# Patient Record
Sex: FEMALE | Race: WHITE | NOT HISPANIC OR LATINO | Employment: OTHER | URBAN - METROPOLITAN AREA
[De-identification: names, ages, dates, MRNs, and addresses within clinical notes are randomized per-mention and may not be internally consistent; named-entity substitution may affect disease eponyms.]

---

## 2018-02-26 ENCOUNTER — OFFICE VISIT (OUTPATIENT)
Dept: FAMILY MEDICINE CLINIC | Facility: CLINIC | Age: 53
End: 2018-02-26

## 2018-02-26 ENCOUNTER — LAB (OUTPATIENT)
Dept: LAB | Facility: HOSPITAL | Age: 53
End: 2018-02-26

## 2018-02-26 VITALS
SYSTOLIC BLOOD PRESSURE: 126 MMHG | BODY MASS INDEX: 32.78 KG/M2 | DIASTOLIC BLOOD PRESSURE: 76 MMHG | HEIGHT: 64 IN | WEIGHT: 192 LBS

## 2018-02-26 DIAGNOSIS — Z01.89 ROUTINE LAB DRAW: Primary | ICD-10-CM

## 2018-02-26 DIAGNOSIS — Z12.39 BREAST CANCER SCREENING: ICD-10-CM

## 2018-02-26 DIAGNOSIS — Z01.89 ROUTINE LAB DRAW: ICD-10-CM

## 2018-02-26 LAB
ALBUMIN SERPL-MCNC: 4.4 G/DL (ref 3.2–4.8)
ALBUMIN/GLOB SERPL: 1.5 G/DL (ref 1.5–2.5)
ALP SERPL-CCNC: 108 U/L (ref 25–100)
ALT SERPL W P-5'-P-CCNC: 19 U/L (ref 7–40)
ANION GAP SERPL CALCULATED.3IONS-SCNC: 6 MMOL/L (ref 3–11)
ARTICHOKE IGE QN: 141 MG/DL (ref 0–130)
AST SERPL-CCNC: 23 U/L (ref 0–33)
BASOPHILS # BLD AUTO: 0.03 10*3/MM3 (ref 0–0.2)
BASOPHILS NFR BLD AUTO: 0.4 % (ref 0–1)
BILIRUB SERPL-MCNC: 0.2 MG/DL (ref 0.3–1.2)
BUN BLD-MCNC: 12 MG/DL (ref 9–23)
BUN/CREAT SERPL: 17.1 (ref 7–25)
CALCIUM SPEC-SCNC: 9.6 MG/DL (ref 8.7–10.4)
CHLORIDE SERPL-SCNC: 105 MMOL/L (ref 99–109)
CHOLEST SERPL-MCNC: 225 MG/DL (ref 0–200)
CO2 SERPL-SCNC: 27 MMOL/L (ref 20–31)
CREAT BLD-MCNC: 0.7 MG/DL (ref 0.6–1.3)
DEPRECATED RDW RBC AUTO: 46.5 FL (ref 37–54)
EOSINOPHIL # BLD AUTO: 0.15 10*3/MM3 (ref 0–0.3)
EOSINOPHIL NFR BLD AUTO: 2.2 % (ref 0–3)
ERYTHROCYTE [DISTWIDTH] IN BLOOD BY AUTOMATED COUNT: 13.3 % (ref 11.3–14.5)
GFR SERPL CREATININE-BSD FRML MDRD: 88 ML/MIN/1.73
GLOBULIN UR ELPH-MCNC: 3 GM/DL
GLUCOSE BLD-MCNC: 93 MG/DL (ref 70–100)
HCT VFR BLD AUTO: 40.6 % (ref 34.5–44)
HCV AB SER DONR QL: NORMAL
HDLC SERPL-MCNC: 74 MG/DL (ref 40–60)
HGB BLD-MCNC: 13 G/DL (ref 11.5–15.5)
IMM GRANULOCYTES # BLD: 0.02 10*3/MM3 (ref 0–0.03)
IMM GRANULOCYTES NFR BLD: 0.3 % (ref 0–0.6)
LYMPHOCYTES # BLD AUTO: 2.19 10*3/MM3 (ref 0.6–4.8)
LYMPHOCYTES NFR BLD AUTO: 32.1 % (ref 24–44)
MCH RBC QN AUTO: 30.5 PG (ref 27–31)
MCHC RBC AUTO-ENTMCNC: 32 G/DL (ref 32–36)
MCV RBC AUTO: 95.3 FL (ref 80–99)
MONOCYTES # BLD AUTO: 0.45 10*3/MM3 (ref 0–1)
MONOCYTES NFR BLD AUTO: 6.6 % (ref 0–12)
NEUTROPHILS # BLD AUTO: 3.99 10*3/MM3 (ref 1.5–8.3)
NEUTROPHILS NFR BLD AUTO: 58.4 % (ref 41–71)
PLATELET # BLD AUTO: 310 10*3/MM3 (ref 150–450)
PMV BLD AUTO: 10.7 FL (ref 6–12)
POTASSIUM BLD-SCNC: 4.7 MMOL/L (ref 3.5–5.5)
PROT SERPL-MCNC: 7.4 G/DL (ref 5.7–8.2)
RBC # BLD AUTO: 4.26 10*6/MM3 (ref 3.89–5.14)
SODIUM BLD-SCNC: 138 MMOL/L (ref 132–146)
TRIGL SERPL-MCNC: 107 MG/DL (ref 0–150)
TSH SERPL DL<=0.05 MIU/L-ACNC: 0.77 MIU/ML (ref 0.35–5.35)
WBC NRBC COR # BLD: 6.83 10*3/MM3 (ref 3.5–10.8)

## 2018-02-26 PROCEDURE — 84443 ASSAY THYROID STIM HORMONE: CPT

## 2018-02-26 PROCEDURE — 80061 LIPID PANEL: CPT

## 2018-02-26 PROCEDURE — 80053 COMPREHEN METABOLIC PANEL: CPT

## 2018-02-26 PROCEDURE — 85025 COMPLETE CBC W/AUTO DIFF WBC: CPT

## 2018-02-26 PROCEDURE — 99212-NC PR NO CHARGE CBC OFFICE OUTPATIENT VISIT 10 MINUTES: Performed by: NURSE PRACTITIONER

## 2018-02-26 PROCEDURE — 36415 COLL VENOUS BLD VENIPUNCTURE: CPT

## 2018-02-26 PROCEDURE — 86803 HEPATITIS C AB TEST: CPT

## 2018-02-26 NOTE — PROGRESS NOTES
Subjective   Viry Andres is a 52 y.o. female here to establish care.  Chief Complaint   Patient presents with   • Establish Care       History of Present Illness   Patient is here to establish care, has been over 3 years since seeing a PCP. Gets exercise one day a week x 1 hour, tries to eat a healthy diet.   Last menses  was in November, normal 5 day cycle, 6-8 months prior to that, has not gone a year without having a period, denies spotting.   The following portions of the patient's history were reviewed and updated as appropriate: allergies, current medications, past family history, past medical history, past social history, past surgical history and problem list.    Review of Systems   Constitutional: Negative for activity change, appetite change, chills, diaphoresis, fatigue, fever and unexpected weight change.   HENT: Negative for congestion, ear pain, hearing loss and tinnitus.    Eyes: Negative for pain, discharge, redness, itching and visual disturbance.   Respiratory: Negative for cough, shortness of breath and wheezing.    Cardiovascular: Negative for chest pain, palpitations and leg swelling.   Gastrointestinal: Positive for constipation (occ). Negative for abdominal distention, abdominal pain, anal bleeding, blood in stool, diarrhea, nausea and vomiting.   Endocrine: Negative for cold intolerance, heat intolerance, polydipsia and polyuria.   Genitourinary: Negative for difficulty urinating, dyspareunia, dysuria, frequency, menstrual problem, vaginal discharge and vaginal pain.   Musculoskeletal: Negative for arthralgias and back pain.   Skin: Negative for color change, pallor, rash and wound.        Sees dermatologist every 6 months   Allergic/Immunologic: Positive for environmental allergies.   Neurological: Negative for dizziness, light-headedness, numbness and headaches.   Psychiatric/Behavioral: Negative for dysphoric mood and sleep disturbance. The patient is not nervous/anxious.      Blood  "pressure 126/76, height 162.6 cm (64\"), weight 87.1 kg (192 lb).    No Known Allergies  Past Medical History:   Diagnosis Date   • Skin tag      Past Surgical History:   Procedure Laterality Date   • SKIN TAG REMOVAL       Family History   Problem Relation Age of Onset   • Migraines Mother    • Arthritis Maternal Grandmother    • Breast cancer Paternal Grandmother    • Skin cancer Paternal Grandfather      Social History     Social History   • Marital status:      Spouse name: N/A   • Number of children: N/A   • Years of education: N/A     Occupational History   • Not on file.     Social History Main Topics   • Smoking status: Never Smoker   • Smokeless tobacco: Never Used   • Alcohol use 1.8 oz/week     3 Glasses of wine per week   • Drug use: No   • Sexual activity: Not on file     Other Topics Concern   • Not on file     Social History Narrative   • No narrative on file       There is no immunization history on file for this patient.  No current outpatient prescriptions on file.    Objective   Physical Exam   Constitutional: She is oriented to person, place, and time. She appears well-developed and well-nourished. No distress.   HENT:   Head: Normocephalic and atraumatic.   Right Ear: External ear normal.   Left Ear: External ear normal.   Nose: Nose normal.   Mouth/Throat: Oropharynx is clear and moist. No oropharyngeal exudate.   Eyes: Conjunctivae and EOM are normal. Pupils are equal, round, and reactive to light.   Neck: Normal range of motion. Neck supple. No thyromegaly present.   Cardiovascular: Normal rate, regular rhythm and normal heart sounds.    No murmur heard.  Pulmonary/Chest: Effort normal and breath sounds normal. No respiratory distress. She has no wheezes. She has no rales. She exhibits no tenderness.   Abdominal: Soft.   Musculoskeletal: She exhibits no edema, tenderness or deformity.   Lymphadenopathy:     She has no cervical adenopathy.   Neurological: She is alert and oriented to " person, place, and time.   Skin: Skin is warm and dry. No rash noted. She is not diaphoretic. No erythema.   Psychiatric: She has a normal mood and affect. Her behavior is normal. Judgment and thought content normal.   Vitals reviewed.      Assessment/Plan   Viry was seen today for establish care.    Diagnoses and all orders for this visit:    Routine lab draw  -     CBC Auto Differential; Future  -     Comprehensive Metabolic Panel; Future  -     Hepatitis C Antibody; Future  -     Lipid Panel; Future  -     TSH; Future    Breast cancer screening  -     Mammo Screening Digital Tomosynthesis Bilateral With CAD; Future      No orders of the defined types were placed in this encounter.      Routine labs ordered to evaluate for chronic conditions. I will contact patient regarding test results and provide instructions regarding any necessary changes in plan of care.    Nutrition and activity goals reviewed including: mainly water to drink, limit white flour/processed sugar, and processed foods, choose fresh fruits, vegetables, fish, lean meats,high fiber carbs, exercise  working toward 150 mins cardio per week, weight training 2x/week.  Advised patient to schedule PAP. I discussed colon cancer screening options, provided printed information of colonoscopy and cologuard, encouraged patient to choose one.  Patient was encouraged to keep me informed of any acute changes, lack of improvement, or any new concerning symptoms.Patient voiced understanding of all instructions and denied further questions.

## 2018-02-26 NOTE — PATIENT INSTRUCTIONS
"Heart-Healthy Eating Plan  Heart-healthy meal planning includes:  · Limiting unhealthy fats.  · Increasing healthy fats.  · Making other small dietary changes.  You may need to talk with your doctor or a diet specialist (dietitian) to create an eating plan that is right for you.  What types of fat should I choose?  · Choose healthy fats. These include olive oil and canola oil, flaxseeds, walnuts, almonds, and seeds.  · Eat more omega-3 fats. These include salmon, mackerel, sardines, tuna, flaxseed oil, and ground flaxseeds. Try to eat fish at least twice each week.  · Limit saturated fats.  ¨ Saturated fats are often found in animal products, such as meats, butter, and cream.  ¨ Plant sources of saturated fats include palm oil, palm kernel oil, and coconut oil.  · Avoid foods with partially hydrogenated oils in them. These include stick margarine, some tub margarines, cookies, crackers, and other baked goods. These contain trans fats.  What general guidelines do I need to follow?  · Check food labels carefully. Identify foods with trans fats or high amounts of saturated fat.  · Fill one half of your plate with vegetables and green salads. Eat 4-5 servings of vegetables per day. A serving of vegetables is:  ¨ 1 cup of raw leafy vegetables.  ¨ ½ cup of raw or cooked cut-up vegetables.  ¨ ½ cup of vegetable juice.  · Fill one fourth of your plate with whole grains. Look for the word \"whole\" as the first word in the ingredient list.  · Fill one fourth of your plate with lean protein foods.  · Eat 4-5 servings of fruit per day. A serving of fruit is:  ¨ One medium whole fruit.  ¨ ¼ cup of dried fruit.  ¨ ½ cup of fresh, frozen, or canned fruit.  ¨ ½ cup of 100% fruit juice.  · Eat more foods that contain soluble fiber. These include apples, broccoli, carrots, beans, peas, and barley. Try to get 20-30 g of fiber per day.  · Eat more home-cooked food. Eat less restaurant, buffet, and fast food.  · Limit or avoid " alcohol.  · Limit foods high in starch and sugar.  · Avoid fried foods.  · Avoid frying your food. Try baking, boiling, grilling, or broiling it instead. You can also reduce fat by:  ¨ Removing the skin from poultry.  ¨ Removing all visible fats from meats.  ¨ Skimming the fat off of stews, soups, and gravies before serving them.  ¨ Steaming vegetables in water or broth.  · Lose weight if you are overweight.  · Eat 4-5 servings of nuts, legumes, and seeds per week:  ¨ One serving of dried beans or legumes equals ½ cup after being cooked.  ¨ One serving of nuts equals 1½ ounces.  ¨ One serving of seeds equals ½ ounce or one tablespoon.  · You may need to keep track of how much salt or sodium you eat. This is especially true if you have high blood pressure. Talk with your doctor or dietitian to get more information.  What foods can I eat?  Grains   Breads, including Malian, white, cintia, wheat, raisin, rye, oatmeal, and Italian. Tortillas that are neither fried nor made with lard or trans fat. Low-fat rolls, including hotdog and hamburger buns and English muffins. Biscuits. Muffins. Waffles. Pancakes. Light popcorn. Whole-grain cereals. Flatbread. [place] toast. Pretzels. Breadsticks. Rusks. Low-fat snacks. Low-fat crackers, including oyster, saltine, matzo, shadia, animal, and rye. Rice and pasta, including brown rice and pastas that are made with whole wheat.  Vegetables   All vegetables.  Fruits   All fruits, but limit coconut.  Meats and Other Protein Sources   Lean, well-trimmed beef, veal, pork, and lamb. Chicken and turkey without skin. All fish and shellfish. Wild duck, rabbit, pheasant, and venison. Egg whites or low-cholesterol egg substitutes. Dried beans, peas, lentils, and tofu. Seeds and most nuts.  Dairy   Low-fat or nonfat cheeses, including ricotta, string, and mozzarella. Skim or 1% milk that is liquid, powdered, or evaporated. Buttermilk that is made with low-fat milk. Nonfat or low-fat  yogurt.  Beverages   Mineral water. Diet carbonated beverages.  Sweets and Desserts   Sherbets and fruit ices. Honey, jam, marmalade, jelly, and syrups. Meringues and gelatins. Pure sugar candy, such as hard candy, jelly beans, gumdrops, mints, marshmallows, and small amounts of dark chocolate. Reid food cake.  Eat all sweets and desserts in moderation.  Fats and Oils   Nonhydrogenated (trans-free) margarines. Vegetable oils, including soybean, sesame, sunflower, olive, peanut, safflower, corn, canola, and cottonseed. Salad dressings or mayonnaise made with a vegetable oil. Limit added fats and oils that you use for cooking, baking, salads, and as spreads.  Other   Cocoa powder. Coffee and tea. All seasonings and condiments.  The items listed above may not be a complete list of recommended foods or beverages. Contact your dietitian for more options.   What foods are not recommended?  Grains   Breads that are made with saturated or trans fats, oils, or whole milk. Croissants. Butter rolls. Cheese breads. Sweet rolls. Donuts. Buttered popcorn. Chow mein noodles. High-fat crackers, such as cheese or butter crackers.  Meats and Other Protein Sources   Fatty meats, such as hotdogs, short ribs, sausage, spareribs, anne, rib eye roast or steak, and mutton. High-fat deli meats, such as salami and bologna. Caviar. Domestic duck and goose. Organ meats, such as kidney, liver, sweetbreads, and heart.  Dairy   Cream, sour cream, cream cheese, and creamed cottage cheese. Whole-milk cheeses, including blue (olga), [place], Brie, [place], American, [place]i, Swiss, cheddar, Camembert, and [place]. Whole or 2% milk that is liquid, evaporated, or condensed. Whole buttermilk. Cream sauce or high-fat cheese sauce. Yogurt that is made from whole milk.  Beverages   Regular sodas and juice drinks with added sugar.  Sweets and Desserts   Frosting. Pudding. Cookies. Cakes other than reid food cake. Candy that has milk chocolate or  white chocolate, hydrogenated fat, butter, coconut, or unknown ingredients. Buttered syrups. Full-fat ice cream or ice cream drinks.  Fats and Oils   Gravy that has suet, meat fat, or shortening. Cocoa butter, hydrogenated oils, palm oil, coconut oil, palm kernel oil. These can often be found in baked products, candy, fried foods, nondairy creamers, and whipped toppings. Solid fats and shortenings, including anne fat, salt pork, lard, and butter. Nondairy cream substitutes, such as coffee creamers and sour cream substitutes. Salad dressings that are made of unknown oils, cheese, or sour cream.  The items listed above may not be a complete list of foods and beverages to avoid. Contact your dietitian for more information.   This information is not intended to replace advice given to you by your health care provider. Make sure you discuss any questions you have with your health care provider.  Document Released: 06/18/2013 Document Revised: 05/25/2017 Document Reviewed: 06/11/2015  [employer] Patient Education © 2017 [employer]    Colonoscopy, Adult  A colonoscopy is an exam to look at the large intestine. It is done to check for problems, such as:  · Lumps (tumors).  · Growths (polyps).  · Swelling (inflammation).  · Bleeding.  What happens before the procedure?  Eating and drinking   Follow instructions from your doctor about eating and drinking. These instructions may include:  · A few days before the procedure - follow a low-fiber diet.  ¨ Avoid nuts.  ¨ Avoid seeds.  ¨ Avoid dried fruit.  ¨ Avoid raw fruits.  ¨ Avoid vegetables.  · 1-3 days before the procedure - follow a clear liquid diet. Avoid liquids that have red or purple dye. Drink only clear liquids, such as:  ¨ Clear broth or bouillon.  ¨ Black coffee or tea.  ¨ Clear juice.  ¨ Clear soft drinks or sports drinks.  ¨ Gelatin dessert.  ¨ Popsicles.  · On the day of the procedure - do not eat or drink anything during the 2 hours before the  procedure.  Bowel prep   If you were prescribed an oral bowel prep:  · Take it as told by your doctor. Starting the day before your procedure, you will need to drink a lot of liquid. The liquid will cause you to poop (have bowel movements) until your poop is almost clear or light green.  · If your skin or butt gets irritated from diarrhea, you may:  ¨ Wipe the area with wipes that have medicine in them, such as adult wet wipes with aloe and vitamin E.  ¨ Put something on your skin that soothes the area, such as petroleum jelly.  · If you throw up (vomit) while drinking the bowel prep, take a break for up to 60 minutes. Then begin the bowel prep again. If you keep throwing up and you cannot take the bowel prep without throwing up, call your doctor.  General instructions   · Ask your doctor about changing or stopping your normal medicines. This is important if you take diabetes medicines or blood thinners.  · Plan to have someone take you home from the hospital or clinic.  What happens during the procedure?  · An IV tube may be put into one of your veins.  · You will be given medicine to help you relax (sedative).  · To reduce your risk of infection:  ¨ Your doctors will wash their hands.  ¨ Your anal area will be washed with soap.  · You will be asked to lie on your side with your knees bent.  · Your doctor will get a long, thin, flexible tube ready. The tube will have a camera and a light on the end.  · The tube will be put into your anus.  · The tube will be gently put into your large intestine.  · Air will be delivered into your large intestine to keep it open. You may feel some pressure or cramping.  · The camera will be used to take photos.  · A small tissue sample may be removed from your body to be looked at under a microscope (biopsy). If any possible problems are found, the tissue will be sent to a lab for testing.  · If small growths are found, your doctor may remove them and have them checked for  cancer.  · The tube that was put into your anus will be slowly removed.  The procedure may vary among doctors and hospitals.  What happens after the procedure?  · Your doctor will check on you often until the medicines you were given have worn off.  · Do not drive for 24 hours after the procedure.  · You may have a small amount of blood in your poop.  · You may pass gas.  · You may have mild cramps or bloating in your belly (abdomen).  · It is up to you to get the results of your procedure. Ask your doctor, or the department performing the procedure, when your results will be ready.  This information is not intended to replace advice given to you by your health care provider. Make sure you discuss any questions you have with your health care provider.  Document Released: 01/20/2012 Document Revised: 10/18/2017 Document Reviewed: 02/28/2017  [employer] Interactive Patient Education © 2017 [employer]    Colorectal Cancer Screening  Colorectal cancer screening is a group of tests used to check for colorectal cancer. Colorectal refers to your colon and rectum. Your colon and rectum are located at the end of your large intestine and carry your bowel movements out of your body.  Why is colorectal cancer screening done?  It is common for abnormal growths (polyps) to form in the lining of your colon, especially as you get older. These polyps can be cancerous or become cancerous. If colorectal cancer is found at an early stage, it is treatable.  Who should be screened for colorectal cancer?  Screening is recommended for all adults at average risk starting at age 50. Tests may be recommended every 1 to 10 years. Your health care provider may recommend earlier or more frequent screening if you have:  · A history of colorectal cancer or polyps.  · A family member with a history of colorectal cancer or polyps.  · Inflammatory bowel disease, such as ulcerative colitis or Crohn disease.  · A type of hereditary colon cancer  syndrome.  · Colorectal cancer symptoms.  Types of screening tests  There are several types of colorectal screening tests. They include:  · Guaiac-based fecal occult blood testing.  · Fecal immunochemical test (FIT).  · Stool DNA test.  · Barium enema.  · Virtual colonoscopy.  · Sigmoidoscopy. During this test, a sigmoidoscope is used to examine your rectum and lower colon. A sigmoidoscope is a flexible tube with a camera that is inserted through your anus into your rectum and lower colon.  · Colonoscopy. During this test, a colonoscope is used to examine your entire colon. A colonoscope is a long, thin, flexible tube with a camera. This test examines your entire colon and rectum.  This information is not intended to replace advice given to you by your health care provider. Make sure you discuss any questions you have with your health care provider.  Document Released: 06/07/2011 Document Revised: 07/27/2017 Document Reviewed: 03/26/2015  [employer] Interactive Patient Education © 2017 [employer]

## 2018-04-17 ENCOUNTER — HOSPITAL ENCOUNTER (OUTPATIENT)
Dept: MAMMOGRAPHY | Facility: HOSPITAL | Age: 53
Discharge: HOME | End: 2018-04-17
Admitting: NURSE PRACTITIONER

## 2018-04-17 DIAGNOSIS — Z12.39 BREAST CANCER SCREENING: ICD-10-CM

## 2018-04-17 PROCEDURE — 77063 BREAST TOMOSYNTHESIS BI: CPT

## 2018-04-17 PROCEDURE — 77067 SCR MAMMO BI INCL CAD: CPT | Performed by: RADIOLOGY

## 2018-04-17 PROCEDURE — 77067 SCR MAMMO BI INCL CAD: CPT

## 2018-04-17 PROCEDURE — 77063 BREAST TOMOSYNTHESIS BI: CPT | Performed by: RADIOLOGY

## 2018-05-03 ENCOUNTER — OFFICE VISIT (OUTPATIENT)
Dept: FAMILY MEDICINE CLINIC | Facility: CLINIC | Age: 53
End: 2018-05-03

## 2018-05-03 ENCOUNTER — RESULTS ENCOUNTER (OUTPATIENT)
Dept: FAMILY MEDICINE CLINIC | Facility: CLINIC | Age: 53
End: 2018-05-03

## 2018-05-03 VITALS
OXYGEN SATURATION: 99 % | BODY MASS INDEX: 32.78 KG/M2 | HEART RATE: 72 BPM | WEIGHT: 192 LBS | SYSTOLIC BLOOD PRESSURE: 134 MMHG | HEIGHT: 64 IN | DIASTOLIC BLOOD PRESSURE: 70 MMHG

## 2018-05-03 DIAGNOSIS — Z12.11 COLON CANCER SCREENING: Primary | ICD-10-CM

## 2018-05-03 DIAGNOSIS — Z12.11 COLON CANCER SCREENING: ICD-10-CM

## 2018-05-03 DIAGNOSIS — Z12.4 CERVICAL CANCER SCREENING: ICD-10-CM

## 2018-05-03 DIAGNOSIS — Z00.00 ANNUAL PHYSICAL EXAM: ICD-10-CM

## 2018-05-03 PROCEDURE — 99396 PREV VISIT EST AGE 40-64: CPT | Performed by: NURSE PRACTITIONER

## 2018-05-03 RX ORDER — ALBUTEROL SULFATE 90 UG/1
2 AEROSOL, METERED RESPIRATORY (INHALATION) EVERY 4 HOURS PRN
COMMUNITY

## 2018-05-03 NOTE — PROGRESS NOTES
Patient Care Team:  DORIAN Bermudez as PCP - General (Nurse Practitioner)     Chief Complaint   Patient presents with   • Physical and pap       Chief complaint: Patient is in today for a physical     Patient is a 52 y.o. female who presents for her yearly physical exam.     HPI   Patient is here for annual physical, labs were done at the end of February, patient does not want to do a colonoscopy, will consider cologuard.  Tries to eat a healthy diet, has not been getting regular exercise.  Review of Systems   Constitutional: Negative for activity change, appetite change, chills, diaphoresis, fatigue, fever and unexpected weight change.   HENT: Negative for congestion, ear pain, hearing loss and tinnitus.    Eyes: Negative for pain, discharge, redness, itching and visual disturbance.   Respiratory: Positive for shortness of breath (occ, exercise induced asthma). Negative for cough and wheezing.    Cardiovascular: Negative for chest pain, palpitations and leg swelling.   Gastrointestinal: Positive for constipation (occ). Negative for abdominal distention, abdominal pain, anal bleeding, blood in stool, diarrhea, nausea and vomiting.   Endocrine: Negative for cold intolerance, heat intolerance, polydipsia and polyuria.   Genitourinary: Negative for difficulty urinating, dyspareunia, dysuria, frequency, menstrual problem, vaginal discharge and vaginal pain.   Musculoskeletal: Negative for arthralgias and back pain.   Skin: Negative for color change, pallor, rash and wound.        Sees dermatologist every 6 months   Allergic/Immunologic: Positive for environmental allergies.   Neurological: Negative for dizziness, light-headedness, numbness and headaches.   Psychiatric/Behavioral: Negative for dysphoric mood and sleep disturbance. The patient is not nervous/anxious.       History  Past Medical History:   Diagnosis Date   • Skin tag       Past Surgical History:   Procedure Laterality Date   • SKIN TAG  REMOVAL        No Known Allergies   Family History   Problem Relation Age of Onset   • Migraines Mother    • Arthritis Maternal Grandmother    • Breast cancer Paternal Grandmother 70   • Skin cancer Paternal Grandfather      Social History     Social History   • Marital status:      Spouse name: N/A   • Number of children: N/A   • Years of education: N/A     Occupational History   • Not on file.     Social History Main Topics   • Smoking status: Never Smoker   • Smokeless tobacco: Never Used   • Alcohol use 1.8 oz/week     3 Glasses of wine per week   • Drug use: No   • Sexual activity: Not on file     Other Topics Concern   • Not on file     Social History Narrative   • No narrative on file      No current outpatient prescriptions on file.                                              There is no immunization history on file for this patient.  Health Maintenance   Topic Date Due   • TDAP/TD VACCINES (1 - Tdap) 1983   • PAP SMEAR  2018   • COLONOSCOPY  2018   • INFLUENZA VACCINE  2018   • ANNUAL PHYSICAL  2019   • MAMMOGRAM  2019   • HEPATITIS C SCREENING  Completed                               No results found for: HGBA1C, MICROALBUR    Additional Testing      Date     Colorectal Screening       []   N/A   []   Complete         Date:  Discussed, ordered cologuard  Where:       Pap      []   N/A   [x]   Complete   Date:    Where:       Mammogram        []   N/A   [x]   Complete Date:    Where:     PSA  (Over age 50)    []   N/A   []   Complete   Date:    Where:     US Aorta  (For male smokers, age 65)     []   N/A   []   Complete   Date:    Where:     CT for Smoker  (Age 55-75, 30 pk yr)    []   N/A   []   Complete   Date:    Where:     Bone Density/DEXA      []   N/A   []   Complete   Date:    Follow-up:     Hep. C  ( 0715-2241)      []   N/A   [x]   Complete   Date:    Where:     Results for orders placed or performed in visit on 18   CBC Auto Differential    Result Value Ref Range    WBC 6.83 3.50 - 10.80 10*3/mm3    RBC 4.26 3.89 - 5.14 10*6/mm3    Hemoglobin 13.0 11.5 - 15.5 g/dL    Hematocrit 40.6 34.5 - 44.0 %    MCV 95.3 80.0 - 99.0 fL    MCH 30.5 27.0 - 31.0 pg    MCHC 32.0 32.0 - 36.0 g/dL    RDW 13.3 11.3 - 14.5 %    RDW-SD 46.5 37.0 - 54.0 fl    MPV 10.7 6.0 - 12.0 fL    Platelets 310 150 - 450 10*3/mm3    Neutrophil % 58.4 41.0 - 71.0 %    Lymphocyte % 32.1 24.0 - 44.0 %    Monocyte % 6.6 0.0 - 12.0 %    Eosinophil % 2.2 0.0 - 3.0 %    Basophil % 0.4 0.0 - 1.0 %    Immature Grans % 0.3 0.0 - 0.6 %    Neutrophils, Absolute 3.99 1.50 - 8.30 10*3/mm3    Lymphocytes, Absolute 2.19 0.60 - 4.80 10*3/mm3    Monocytes, Absolute 0.45 0.00 - 1.00 10*3/mm3    Eosinophils, Absolute 0.15 0.00 - 0.30 10*3/mm3    Basophils, Absolute 0.03 0.00 - 0.20 10*3/mm3    Immature Grans, Absolute 0.02 0.00 - 0.03 10*3/mm3   Comprehensive Metabolic Panel   Result Value Ref Range    Glucose 93 70 - 100 mg/dL    BUN 12 9 - 23 mg/dL    Creatinine 0.70 0.60 - 1.30 mg/dL    Sodium 138 132 - 146 mmol/L    Potassium 4.7 3.5 - 5.5 mmol/L    Chloride 105 99 - 109 mmol/L    CO2 27.0 20.0 - 31.0 mmol/L    Calcium 9.6 8.7 - 10.4 mg/dL    Total Protein 7.4 5.7 - 8.2 g/dL    Albumin 4.40 3.20 - 4.80 g/dL    ALT (SGPT) 19 7 - 40 U/L    AST (SGOT) 23 0 - 33 U/L    Alkaline Phosphatase 108 (H) 25 - 100 U/L    Total Bilirubin 0.2 (L) 0.3 - 1.2 mg/dL    eGFR Non African Amer 88 >60 mL/min/1.73    Globulin 3.0 gm/dL    A/G Ratio 1.5 1.5 - 2.5 g/dL    BUN/Creatinine Ratio 17.1 7.0 - 25.0    Anion Gap 6.0 3.0 - 11.0 mmol/L   Hepatitis C Antibody   Result Value Ref Range    Hepatitis C Ab Non-Reactive Non-Reactive   Lipid Panel   Result Value Ref Range    Total Cholesterol 225 (H) 0 - 200 mg/dL    Triglycerides 107 0 - 150 mg/dL    HDL Cholesterol 74 (H) 40 - 60 mg/dL    LDL Cholesterol  141 (H) 0 - 130 mg/dL   TSH   Result Value Ref Range    TSH 0.772 0.350 - 5.350 mIU/mL            /70   Pulse 72   " Ht 162.6 cm (64\")   Wt 87.1 kg (192 lb)   SpO2 99%   BMI 32.96 kg/m²       Physical Exam   Constitutional: She is oriented to person, place, and time. She appears well-developed and well-nourished. No distress.   HENT:   Head: Normocephalic and atraumatic.   Right Ear: External ear normal.   Left Ear: External ear normal.   Nose: Nose normal.   Mouth/Throat: Oropharynx is clear and moist. No oropharyngeal exudate.   Eyes: Conjunctivae and EOM are normal. Pupils are equal, round, and reactive to light. No scleral icterus.   Neck: Normal range of motion. Neck supple. No thyromegaly present.   Cardiovascular: Normal rate, regular rhythm, normal heart sounds and intact distal pulses.    No murmur heard.  No carotid bruit   Pulmonary/Chest: Effort normal and breath sounds normal. No respiratory distress. She has no wheezes. She has no rales. She exhibits no tenderness.   Abdominal: Soft. Bowel sounds are normal. She exhibits no distension and no mass. There is no tenderness. There is no rebound and no guarding. No hernia.   Genitourinary: Vagina normal and uterus normal. Pelvic exam was performed with patient in the knee-chest position. Cervix exhibits friability. Cervix exhibits no motion tenderness and no discharge. Right adnexum displays no mass, no tenderness and no fullness. Left adnexum displays no mass, no tenderness and no fullness.   Musculoskeletal: She exhibits no edema, tenderness or deformity.   Normal range of motion of major joints   Lymphadenopathy:     She has no cervical adenopathy.   Neurological: She is alert and oriented to person, place, and time.   Skin: Skin is warm and dry. Capillary refill takes less than 2 seconds. She is not diaphoretic.   Psychiatric: She has a normal mood and affect. Her behavior is normal. Judgment and thought content normal.   Vitals reviewed.          Counseling provided on diet and nutrition, exercise and hypertension.    Diagnoses and all orders for this " visit:    Colon cancer screening  -     Cologuard - Stool, Per Rectum; Future    Annual physical exam    Other orders  -     albuterol (PROVENTIL HFA;VENTOLIN HFA) 108 (90 Base) MCG/ACT inhaler; Inhale 2 puffs Every 4 (Four) Hours As Needed.       DORIAN Bermudez   5/3/2018   2:16 PM

## 2018-05-03 NOTE — PATIENT INSTRUCTIONS
Preventing Hypertension  Hypertension, commonly called high blood pressure, is when the force of blood pumping through the arteries is too strong. Arteries are blood vessels that carry blood from the heart throughout the body. Over time, hypertension can damage the arteries and decrease blood flow to important parts of the body, including the brain, heart, and kidneys. Often, hypertension does not cause symptoms until blood pressure is very high. For this reason, it is important to have your blood pressure checked on a regular basis.  Hypertension can often be prevented with diet and lifestyle changes. If you already have hypertension, you can control it with diet and lifestyle changes, as well as medicine.  What nutrition changes can be made?  Maintain a healthy diet. This includes:  · Eating less salt (sodium). Ask your health care provider how much sodium is safe for you to have. The general recommendation is to consume less than 1 tsp (2,300 mg) of sodium a day.  ¨ Do not add salt to your food.  ¨ Choose low-sodium options when grocery shopping and eating out.  · Limiting fats in your diet. You can do this by eating low-fat or fat-free dairy products and by eating less red meat.  · Eating more fruits, vegetables, and whole grains. Make a goal to eat:  ¨ 1½-2 cups of fresh fruits and vegetables each day.  ¨ 3-4 servings of whole grains each day.  · Avoiding foods and beverages that have added sugars.  · Eating fish that contain healthy fats (omega-3 fatty acids), such as mackerel or salmon.  If you need help putting together a healthy eating plan, try the DASH diet. This diet is high in fruits, vegetables, and whole grains. It is low in sodium, red meat, and added sugars. DASH stands for Dietary Approaches to Stop Hypertension.  What lifestyle changes can be made?  · Lose weight if you are overweight. Losing just 3?5% of your body weight can help prevent or control hypertension.  ¨ For example, if your present  weight is 200 lb (91 kg), a loss of 3-5% of your weight means losing 6-10 lb (2.7-4.5 kg).  ¨ Ask your health care provider to help you with a diet and exercise plan to safely lose weight.  · Get enough exercise. Do at least 150 minutes of moderate-intensity exercise each week.  ¨ You could do this in short exercise sessions several times a day, or you could do longer exercise sessions a few times a week. For example, you could take a brisk 10-minute walk or bike ride, 3 times a day, for 5 days a week.  · Find ways to reduce stress, such as exercising, meditating, listening to music, or taking a yoga class. If you need help reducing stress, ask your health care provider.  · Do not smoke. This includes e-cigarettes. Chemicals in tobacco and nicotine products raise your blood pressure each time you smoke. If you need help quitting, ask your health care provider.  · Avoid alcohol. If you drink alcohol, limit alcohol intake to no more than 1 drink a day for nonpregnant women and 2 drinks a day for men. One drink equals 12 oz of beer, 5 oz of wine, or 1½ oz of hard liquor.  Why are these changes important?  Diet and lifestyle changes can help you prevent hypertension, and they may make you feel better overall and improve your quality of life. If you have hypertension, making these changes will help you control it and help prevent major complications, such as:  · Hardening and narrowing of arteries that supply blood to:  ¨ Your heart. This can cause a heart attack.  ¨ Your brain. This can cause a stroke.  ¨ Your kidneys. This can cause kidney failure.  · Stress on your heart muscle, which can cause heart failure.  What can I do to lower my risk?  · Work with your health care provider to make a hypertension prevention plan that works for you. Follow your plan and keep all follow-up visits as told by your health care provider.  · Learn how to check your blood pressure at home. Make sure that you know your personal target  blood pressure, as told by your health care provider.  How is this treated?  In addition to diet and lifestyle changes, your health care provider may recommend medicines to help lower your blood pressure. You may need to try a few different medicines to find what works best for you. You also may need to take more than one medicine. Take over-the-counter and prescription medicines only as told by your health care provider.  Where to find support:  Your health care provider can help you prevent hypertension and help you keep your blood pressure at a healthy level. Your local hospital or your community may also provide support services and prevention programs.  The American Heart Association offers an online support network at: http://supportnetwork.heart.org/high-blood-pressure  Where to find more information:  Learn more about hypertension from:  · National Heart, Lung, and Blood [place]: www.nhlbi.nih.gov/health/health-topics/topics/hbp  · Centers for Disease Control and Prevention: www.cdc.gov/bloodpressure  · American Academy of Family Physicians: http://familydoctor.org/familydoctor/en/diseases-conditions/high-blood-pressure.printerview.all.html  Learn more about the DASH diet from:  · National Heart, Lung, and Blood [place]: www.nhlbi.nih.gov/health/health-topics/topics/dash  Contact a health care provider if:  · You think you are having a reaction to medicines you have taken.  · You have recurrent headaches or feel dizzy.  · You have swelling in your ankles.  · You have trouble with your vision.  Summary  · Hypertension often does not cause any symptoms until blood pressure is very high. It is important to get your blood pressure checked regularly.  · Diet and lifestyle changes are the most important steps in preventing hypertension.  · By keeping your blood pressure in a healthy range, you can prevent complications like heart attack, heart failure, stroke, and kidney failure.  · Work with your health care  "provider to make a hypertension prevention plan that works for you.  This information is not intended to replace advice given to you by your health care provider. Make sure you discuss any questions you have with your health care provider.  Document Released: 01/01/2017 Document Revised: 08/28/2017 Document Reviewed: 08/28/2017  [employer] Interactive Patient Education © 2017 [employer]    DASH Eating Plan  DASH stands for \"Dietary Approaches to Stop Hypertension.\" The DASH eating plan is a healthy eating plan that has been shown to reduce high blood pressure (hypertension). It may also reduce your risk for type 2 diabetes, heart disease, and stroke. The DASH eating plan may also help with weight loss.  What are tips for following this plan?  General guidelines   · Avoid eating more than 2,300 mg (milligrams) of salt (sodium) a day. If you have hypertension, you may need to reduce your sodium intake to 1,500 mg a day.  · Limit alcohol intake to no more than 1 drink a day for nonpregnant women and 2 drinks a day for men. One drink equals 12 oz of beer, 5 oz of wine, or 1½ oz of hard liquor.  · Work with your health care provider to maintain a healthy body weight or to lose weight. Ask what an ideal weight is for you.  · Get at least 30 minutes of exercise that causes your heart to beat faster (aerobic exercise) most days of the week. Activities may include walking, swimming, or biking.  · Work with your health care provider or diet and nutrition specialist (dietitian) to adjust your eating plan to your individual calorie needs.  Reading food labels   · Check food labels for the amount of sodium per serving. Choose foods with less than 5 percent of the Daily Value of sodium. Generally, foods with less than 300 mg of sodium per serving fit into this eating plan.  · To find whole grains, look for the word \"whole\" as the first word in the ingredient list.  Shopping   · Buy products labeled as \"low-sodium\" or \"no salt " "added.\"  · Buy fresh foods. Avoid canned foods and premade or frozen meals.  Cooking   · Avoid adding salt when cooking. Use salt-free seasonings or herbs instead of table salt or sea salt. Check with your health care provider or pharmacist before using salt substitutes.  · Do not thacker foods. Cook foods using healthy methods such as baking, boiling, grilling, and broiling instead.  · Cook with heart-healthy oils, such as olive, canola, soybean, or sunflower oil.  Meal planning     · Eat a balanced diet that includes:  ¨ 5 or more servings of fruits and vegetables each day. At each meal, try to fill half of your plate with fruits and vegetables.  ¨ Up to 6-8 servings of whole grains each day.  ¨ Less than 6 oz of lean meat, poultry, or fish each day. A 3-oz serving of meat is about the same size as a deck of cards. One egg equals 1 oz.  ¨ 2 servings of low-fat dairy each day.  ¨ A serving of nuts, seeds, or beans 5 times each week.  ¨ Heart-healthy fats. Healthy fats called Omega-3 fatty acids are found in foods such as flaxseeds and coldwater fish, like sardines, salmon, and mackerel.  · Limit how much you eat of the following:  ¨ Canned or prepackaged foods.  ¨ Food that is high in trans fat, such as fried foods.  ¨ Food that is high in saturated fat, such as fatty meat.  ¨ Sweets, desserts, sugary drinks, and other foods with added sugar.  ¨ Full-fat dairy products.  · Do not salt foods before eating.  · Try to eat at least 2 vegetarian meals each week.  · Eat more home-cooked food and less restaurant, buffet, and fast food.  · When eating at a restaurant, ask that your food be prepared with less salt or no salt, if possible.  What foods are recommended?  The items listed may not be a complete list. Talk with your dietitian about what dietary choices are best for you.  Grains   Whole-grain or whole-wheat bread. Whole-grain or whole-wheat pasta. Brown rice. Oatmeal. Quinoa. Bulgur. Whole-grain and low-sodium " cereals. Morenita bread. Low-fat, low-sodium crackers. Whole-wheat flour tortillas.  Vegetables   Fresh or frozen vegetables (raw, steamed, roasted, or grilled). Low-sodium or reduced-sodium tomato and vegetable juice. Low-sodium or reduced-sodium tomato sauce and tomato paste. Low-sodium or reduced-sodium canned vegetables.  Fruits   All fresh, dried, or frozen fruit. Canned fruit in natural juice (without added sugar).  Meat and other protein foods   Skinless chicken or turkey. Ground chicken or turkey. Pork with fat trimmed off. Fish and seafood. Egg whites. Dried beans, peas, or lentils. Unsalted nuts, nut butters, and seeds. Unsalted canned beans. Lean cuts of beef with fat trimmed off. Low-sodium, lean deli meat.  Dairy   Low-fat (1%) or fat-free (skim) milk. Fat-free, low-fat, or reduced-fat cheeses. Nonfat, low-sodium ricotta or cottage cheese. Low-fat or nonfat yogurt. Low-fat, low-sodium cheese.  Fats and oils   Soft margarine without trans fats. Vegetable oil. Low-fat, reduced-fat, or light mayonnaise and salad dressings (reduced-sodium). Canola, safflower, olive, soybean, and sunflower oils. Avocado.  Seasoning and other foods   Herbs. Spices. Seasoning mixes without salt. Unsalted popcorn and pretzels. Fat-free sweets.  What foods are not recommended?  The items listed may not be a complete list. Talk with your dietitian about what dietary choices are best for you.  Grains   Baked goods made with fat, such as croissants, muffins, or some breads. Dry pasta or rice meal packs.  Vegetables   Creamed or fried vegetables. Vegetables in a cheese sauce. Regular canned vegetables (not low-sodium or reduced-sodium). Regular canned tomato sauce and paste (not low-sodium or reduced-sodium). Regular tomato and vegetable juice (not low-sodium or reduced-sodium). Pickles. Olives.  Fruits   Canned fruit in a light or heavy syrup. Fried fruit. Fruit in cream or butter sauce.  Meat and other protein foods   Fatty cuts of  meat. Ribs. Fried meat. Sparks. Sausage. Bologna and other processed lunch meats. Salami. Fatback. Hotdogs. Bratwurst. Salted nuts and seeds. Canned beans with added salt. Canned or smoked fish. Whole eggs or egg yolks. Chicken or turkey with skin.  Dairy   Whole or 2% milk, cream, and half-and-half. Whole or full-fat cream cheese. Whole-fat or sweetened yogurt. Full-fat cheese. Nondairy creamers. Whipped toppings. Processed cheese and cheese spreads.  Fats and oils   Butter. Stick margarine. Lard. Shortening. Ghee. Sparks fat. Tropical oils, such as coconut, palm kernel, or palm oil.  Seasoning and other foods   Salted popcorn and pretzels. Onion salt, garlic salt, seasoned salt, table salt, and sea salt. Worcestershire sauce. Tartar sauce. Barbecue sauce. Teriyaki sauce. Soy sauce, including reduced-sodium. Steak sauce. Canned and packaged gravies. Fish sauce. Oyster sauce. Cocktail sauce. Horseradish that you find on the shelf. Ketchup. Mustard. Meat flavorings and tenderizers. Bouillon cubes. Hot sauce and Tabasco sauce. Premade or packaged marinades. Premade or packaged taco seasonings. Relishes. Regular salad dressings.  Where to find more information:  · National Heart, Lung, and Blood [place]: www.nhlbi.nih.gov  · American Heart Association: www.heart.org  Summary  · The DASH eating plan is a healthy eating plan that has been shown to reduce high blood pressure (hypertension). It may also reduce your risk for type 2 diabetes, heart disease, and stroke.  · With the DASH eating plan, you should limit salt (sodium) intake to 2,300 mg a day. If you have hypertension, you may need to reduce your sodium intake to 1,500 mg a day.  · When on the DASH eating plan, aim to eat more fresh fruits and vegetables, whole grains, lean proteins, low-fat dairy, and heart-healthy fats.  · Work with your health care provider or diet and nutrition specialist (dietitian) to adjust your eating plan to your individual calorie  needs.  This information is not intended to replace advice given to you by your health care provider. Make sure you discuss any questions you have with your health care provider.  Document Released: 12/06/2012 Document Revised: 12/11/2017 Document Reviewed: 12/11/2017  [employer] Interactive Patient Education © 2017 [employer]    Colorectal Cancer Screening  Colorectal cancer screening is a group of tests used to check for colorectal cancer. Colorectal refers to your colon and rectum. Your colon and rectum are located at the end of your large intestine and carry your bowel movements out of your body.  Why is colorectal cancer screening done?  It is common for abnormal growths (polyps) to form in the lining of your colon, especially as you get older. These polyps can be cancerous or become cancerous. If colorectal cancer is found at an early stage, it is treatable.  Who should be screened for colorectal cancer?  Screening is recommended for all adults at average risk starting at age 50. Tests may be recommended every 1 to 10 years. Your health care provider may recommend earlier or more frequent screening if you have:  · A history of colorectal cancer or polyps.  · A family member with a history of colorectal cancer or polyps.  · Inflammatory bowel disease, such as ulcerative colitis or Crohn disease.  · A type of hereditary colon cancer syndrome.  · Colorectal cancer symptoms.  Types of screening tests  There are several types of colorectal screening tests. They include:  · Guaiac-based fecal occult blood testing.  · Fecal immunochemical test (FIT).  · Stool DNA test.  · Barium enema.  · Virtual colonoscopy.  · Sigmoidoscopy. During this test, a sigmoidoscope is used to examine your rectum and lower colon. A sigmoidoscope is a flexible tube with a camera that is inserted through your anus into your rectum and lower colon.  · Colonoscopy. During this test, a colonoscope is used to examine your entire colon. A  colonoscope is a long, thin, flexible tube with a camera. This test examines your entire colon and rectum.  This information is not intended to replace advice given to you by your health care provider. Make sure you discuss any questions you have with your health care provider.  Document Released: 06/07/2011 Document Revised: 07/27/2017 Document Reviewed: 03/26/2015  [employer] Interactive Patient Education © 2017 [employer]

## 2019-10-08 ENCOUNTER — TRANSCRIBE ORDERS (OUTPATIENT)
Dept: ADMINISTRATIVE | Facility: HOSPITAL | Age: 54
End: 2019-10-08

## 2019-10-08 DIAGNOSIS — Z12.31 VISIT FOR SCREENING MAMMOGRAM: Primary | ICD-10-CM

## 2019-12-23 ENCOUNTER — HOSPITAL ENCOUNTER (OUTPATIENT)
Dept: MAMMOGRAPHY | Facility: HOSPITAL | Age: 54
Discharge: HOME | End: 2019-12-23
Admitting: NURSE PRACTITIONER

## 2019-12-23 DIAGNOSIS — Z12.31 VISIT FOR SCREENING MAMMOGRAM: ICD-10-CM

## 2019-12-23 PROCEDURE — 77067 SCR MAMMO BI INCL CAD: CPT

## 2019-12-23 PROCEDURE — 77067 SCR MAMMO BI INCL CAD: CPT | Performed by: RADIOLOGY

## 2019-12-23 PROCEDURE — 77063 BREAST TOMOSYNTHESIS BI: CPT

## 2019-12-23 PROCEDURE — 77063 BREAST TOMOSYNTHESIS BI: CPT | Performed by: RADIOLOGY

## 2021-02-12 ENCOUNTER — TRANSCRIBE ORDERS (OUTPATIENT)
Dept: ADMINISTRATIVE | Facility: HOSPITAL | Age: 56
End: 2021-02-12

## 2021-02-12 DIAGNOSIS — Z12.31 VISIT FOR SCREENING MAMMOGRAM: Primary | ICD-10-CM

## 2021-04-02 ENCOUNTER — HOSPITAL ENCOUNTER (OUTPATIENT)
Dept: MAMMOGRAPHY | Facility: HOSPITAL | Age: 56
Discharge: HOME | End: 2021-04-02
Admitting: NURSE PRACTITIONER

## 2021-04-02 DIAGNOSIS — Z12.31 VISIT FOR SCREENING MAMMOGRAM: ICD-10-CM

## 2021-04-02 PROCEDURE — 77067 SCR MAMMO BI INCL CAD: CPT

## 2021-04-02 PROCEDURE — 77063 BREAST TOMOSYNTHESIS BI: CPT

## 2021-04-02 PROCEDURE — 77067 SCR MAMMO BI INCL CAD: CPT | Performed by: RADIOLOGY

## 2021-04-02 PROCEDURE — 77063 BREAST TOMOSYNTHESIS BI: CPT | Performed by: RADIOLOGY

## 2023-08-08 ENCOUNTER — TELEPHONE (OUTPATIENT)
Dept: FAMILY MEDICINE CLINIC | Facility: CLINIC | Age: 58
End: 2023-08-08

## 2023-08-08 NOTE — TELEPHONE ENCOUNTER
Caller: Viry Andres    Relationship to patient: Self    Best call back number: [phone]    Chief complaint: PHYSICAL    Type of visit: PHYSICAL    If rescheduling, when is the original appointment: 08/11/2023     Additional notes:PATIENT HAS BEEN SCHEDULED WITH A PROVIDER WHOM IS NOT PCP

## 2023-08-11 ENCOUNTER — OFFICE VISIT (OUTPATIENT)
Dept: FAMILY MEDICINE CLINIC | Facility: CLINIC | Age: 58
End: 2023-08-11
Payer: COMMERCIAL

## 2023-08-11 VITALS
DIASTOLIC BLOOD PRESSURE: 82 MMHG | HEART RATE: 76 BPM | OXYGEN SATURATION: 95 % | SYSTOLIC BLOOD PRESSURE: 120 MMHG | WEIGHT: 192.4 LBS | HEIGHT: 64 IN | BODY MASS INDEX: 32.85 KG/M2

## 2023-08-11 DIAGNOSIS — Z00.00 ANNUAL PHYSICAL EXAM: Primary | ICD-10-CM

## 2023-08-11 DIAGNOSIS — E66.9 CLASS 1 OBESITY WITHOUT SERIOUS COMORBIDITY WITH BODY MASS INDEX (BMI) OF 33.0 TO 33.9 IN ADULT, UNSPECIFIED OBESITY TYPE: ICD-10-CM

## 2023-08-11 DIAGNOSIS — J30.2 SEASONAL ALLERGIES: ICD-10-CM

## 2023-08-11 RX ORDER — LEVOCETIRIZINE DIHYDROCHLORIDE 5 MG/1
5 TABLET, FILM COATED ORAL EVERY EVENING
Qty: 30 TABLET | Refills: 2 | Status: SHIPPED | OUTPATIENT
Start: 2023-08-11

## 2023-08-11 NOTE — PROGRESS NOTES
"Chief Complaint  Annual Exam    Subjective          Viry Andres presents to [facility] for   History of Present Illness  Pt is here today for annual physical. She does not report any concerns at this time. She would like to discuss weight loss; she reports she has tried different diet plans. She is currently doing a low carb, healthy eating plan. She is not fasting this morning. She had coffee with half n half.     She reports she has been dealing with consistent sinus concerns. She has on-going congestion. She does not take allergy medication.     Discussed vaccines; pt defers all at this time. She does not wish to do a colonoscopy for screening of colorectal cancer. She also defers mammogram at this time.     Objective   Vital Signs:   Vitals:    08/11/23 1010   BP: 120/82   Pulse: 76   SpO2: 95%   Weight: 87.3 kg (192 lb 6.4 oz)   Height: 162.6 cm (64.02\")     Body mass index is 33.01 kg/mý.        Physical Exam  Vitals reviewed.   Constitutional:       Appearance: Normal appearance. She is obese.   HENT:      Right Ear: Tympanic membrane, ear canal and external ear normal.      Left Ear: Tympanic membrane, ear canal and external ear normal.      Nose: Nose normal.      Mouth/Throat:      Mouth: Mucous membranes are moist.      Pharynx: Oropharynx is clear.   Eyes:      Extraocular Movements: Extraocular movements intact.      Conjunctiva/sclera: Conjunctivae normal.      Pupils: Pupils are equal, round, and reactive to light.   Cardiovascular:      Rate and Rhythm: Normal rate and regular rhythm.      Heart sounds: Normal heart sounds.   Pulmonary:      Effort: Pulmonary effort is normal.      Breath sounds: Normal breath sounds.   Abdominal:      General: Bowel sounds are normal.      Palpations: Abdomen is soft.      Tenderness: There is no abdominal tenderness. There is no guarding.   Musculoskeletal:         General: Normal range of motion.      Cervical back: Normal range " of motion and neck supple.   Skin:     General: Skin is warm.   Neurological:      Mental Status: She is alert and oriented to person, place, and time.   Psychiatric:         Mood and Affect: Mood normal.         Behavior: Behavior normal.         Thought Content: Thought content normal.      Result Review :                  There is no immunization history on file for this patient.    Health Maintenance   Topic Date Due    COLORECTAL CANCER SCREENING  Never done    COVID-19 Vaccine (1) Never done    TDAP/TD VACCINES (1 - Tdap) Never done    ZOSTER VACCINE (1 of 2) Never done    ANNUAL PHYSICAL  05/03/2019    PAP SMEAR  05/03/2021    MAMMOGRAM  04/02/2022    INFLUENZA VACCINE  10/01/2023    HEPATITIS C SCREENING  Completed    Pneumococcal Vaccine 0-64  Aged Out        Assessment and Plan    Diagnoses and all orders for this visit:    1. Annual physical exam (Primary)  -     CBC (No Diff); Future  -     Comprehensive Metabolic Panel; Future    2. Class 1 obesity without serious comorbidity with body mass index (BMI) of 33.0 to 33.9 in adult, unspecified obesity type  -     Lipid Panel; Future  -     Hemoglobin A1c; Future  -     TSH Rfx On Abnormal To Free T4; Future    3. Seasonal allergies  -     levocetirizine (XYZAL) 5 MG tablet; Take 1 tablet by mouth Every Evening.  Dispense: 30 tablet; Refill: 2        Counseling/anticipatory guidance: Nutrition, physical activity, healthy weight, dental health, mental health, eye exam, immunizations, screenings    Counseled pt on vaccinations. She defers all vaccines at this time. Discussed routine screening for colorectal cancer with colonoscopy and routine screen for breast cancer with mammogram. Pt does not wish to do either at this time. Routine dental and vision exams are up to date.       Follow Up   Return in about 1 year (around 8/11/2024) for Annual.  Patient was given instructions and counseling regarding her condition or for health maintenance advice. Please see  specific information pulled into the AVS if appropriate.

## 2023-08-14 ENCOUNTER — LAB (OUTPATIENT)
Dept: INTERNAL MEDICINE | Facility: CLINIC | Age: 58
End: 2023-08-14
Payer: COMMERCIAL

## 2023-08-14 DIAGNOSIS — Z00.00 ANNUAL PHYSICAL EXAM: ICD-10-CM

## 2023-08-14 DIAGNOSIS — E66.9 CLASS 1 OBESITY WITHOUT SERIOUS COMORBIDITY WITH BODY MASS INDEX (BMI) OF 33.0 TO 33.9 IN ADULT, UNSPECIFIED OBESITY TYPE: ICD-10-CM

## 2023-08-14 LAB
ALBUMIN SERPL-MCNC: 4.1 G/DL (ref 3.5–5.2)
ALBUMIN/GLOB SERPL: 1.3 G/DL
ALP SERPL-CCNC: 94 U/L (ref 39–117)
ALT SERPL W P-5'-P-CCNC: 16 U/L (ref 1–33)
ANION GAP SERPL CALCULATED.3IONS-SCNC: 10.8 MMOL/L (ref 5–15)
AST SERPL-CCNC: 22 U/L (ref 1–32)
BILIRUB SERPL-MCNC: <0.2 MG/DL (ref 0–1.2)
BUN SERPL-MCNC: 16 MG/DL (ref 6–20)
BUN/CREAT SERPL: 22.5 (ref 7–25)
CALCIUM SPEC-SCNC: 9.9 MG/DL (ref 8.6–10.5)
CHLORIDE SERPL-SCNC: 105 MMOL/L (ref 98–107)
CHOLEST SERPL-MCNC: 214 MG/DL (ref 0–200)
CO2 SERPL-SCNC: 23.2 MMOL/L (ref 22–29)
CREAT SERPL-MCNC: 0.71 MG/DL (ref 0.57–1)
DEPRECATED RDW RBC AUTO: 39.7 FL (ref 37–54)
EGFRCR SERPLBLD CKD-EPI 2021: 98.7 ML/MIN/1.73
ERYTHROCYTE [DISTWIDTH] IN BLOOD BY AUTOMATED COUNT: 12.2 % (ref 12.3–15.4)
GLOBULIN UR ELPH-MCNC: 3.1 GM/DL
GLUCOSE SERPL-MCNC: 92 MG/DL (ref 65–99)
HBA1C MFR BLD: 5.3 % (ref 4.8–5.6)
HCT VFR BLD AUTO: 38.8 % (ref 34–46.6)
HDLC SERPL-MCNC: 58 MG/DL (ref 40–60)
HGB BLD-MCNC: 12.9 G/DL (ref 12–15.9)
LDLC SERPL CALC-MCNC: 137 MG/DL (ref 0–100)
LDLC/HDLC SERPL: 2.33 {RATIO}
MCH RBC QN AUTO: 29.9 PG (ref 26.6–33)
MCHC RBC AUTO-ENTMCNC: 33.2 G/DL (ref 31.5–35.7)
MCV RBC AUTO: 90 FL (ref 79–97)
PLATELET # BLD AUTO: 326 10*3/MM3 (ref 140–450)
PMV BLD AUTO: 10.8 FL (ref 6–12)
POTASSIUM SERPL-SCNC: 4.4 MMOL/L (ref 3.5–5.2)
PROT SERPL-MCNC: 7.2 G/DL (ref 6–8.5)
RBC # BLD AUTO: 4.31 10*6/MM3 (ref 3.77–5.28)
SODIUM SERPL-SCNC: 139 MMOL/L (ref 136–145)
TRIGL SERPL-MCNC: 105 MG/DL (ref 0–150)
TSH SERPL DL<=0.05 MIU/L-ACNC: 1.65 UIU/ML (ref 0.27–4.2)
VLDLC SERPL-MCNC: 19 MG/DL (ref 5–40)
WBC NRBC COR # BLD: 6.51 10*3/MM3 (ref 3.4–10.8)

## 2023-08-14 PROCEDURE — 85027 COMPLETE CBC AUTOMATED: CPT | Performed by: NURSE PRACTITIONER

## 2023-08-14 PROCEDURE — 80053 COMPREHEN METABOLIC PANEL: CPT | Performed by: NURSE PRACTITIONER

## 2023-08-14 PROCEDURE — 83036 HEMOGLOBIN GLYCOSYLATED A1C: CPT | Performed by: NURSE PRACTITIONER

## 2023-08-14 PROCEDURE — 80061 LIPID PANEL: CPT | Performed by: NURSE PRACTITIONER

## 2023-08-14 PROCEDURE — 84443 ASSAY THYROID STIM HORMONE: CPT | Performed by: NURSE PRACTITIONER

## 2023-09-15 ENCOUNTER — PATIENT MESSAGE (OUTPATIENT)
Dept: FAMILY MEDICINE CLINIC | Facility: CLINIC | Age: 58
End: 2023-09-15
Payer: COMMERCIAL

## 2023-09-15 DIAGNOSIS — Z12.31 ENCOUNTER FOR SCREENING MAMMOGRAM FOR MALIGNANT NEOPLASM OF BREAST: Primary | ICD-10-CM

## 2023-09-15 NOTE — TELEPHONE ENCOUNTER
From: Daya Batista  To: Viry Andres  Sent: 9/15/2023 8:58 AM EDT  Subject: Mammogram    Hello,     At [facility], we are committed to helping you pursue good health. Preventive measures are a top priority. We noticed that you have not had a Mammogram over the last year, or if you have, we do not have the results for your record.     If you have completed your Mammogram somewhere else, please reply to this message and let us know when and where so we can update your records.    If you would like us to schedule a Mammogram for you please also let us know!    Thank you for allowing us to be a part of your healthcare. If you have any questions please feel free to contact us at [phone].    [facility] Tahmina Parker

## 2023-10-26 ENCOUNTER — HOSPITAL ENCOUNTER (OUTPATIENT)
Dept: MAMMOGRAPHY | Facility: HOSPITAL | Age: 58
Discharge: HOME | End: 2023-10-26
Admitting: NURSE PRACTITIONER
Payer: COMMERCIAL

## 2023-10-26 DIAGNOSIS — Z12.31 ENCOUNTER FOR SCREENING MAMMOGRAM FOR MALIGNANT NEOPLASM OF BREAST: ICD-10-CM

## 2023-10-26 PROCEDURE — 77063 BREAST TOMOSYNTHESIS BI: CPT

## 2023-10-26 PROCEDURE — 77067 SCR MAMMO BI INCL CAD: CPT
